# Patient Record
Sex: FEMALE | Race: WHITE
[De-identification: names, ages, dates, MRNs, and addresses within clinical notes are randomized per-mention and may not be internally consistent; named-entity substitution may affect disease eponyms.]

---

## 2017-11-30 NOTE — TN
cc:

SUZE DOMÍNGUEZ MD

****

 

 

DATE OF SURGERY:

11/30/2017

 

PREOPERATIVE DIAGNOSIS

Right wrist carpal tunnel syndrome.

 

POSTOPERATIVE DIAGNOSIS

Right wrist carpal tunnel syndrome.

 

 

PROCEDURE

Right wrist carpal tunnel release.

 

PROCEDURE IN DETAIL

Informed consent was obtained.  The patient taken to the operating room and

placed in supine position on the operative table.  She was administered general

anesthesia by Dr. Wood of the Anesthesia Department.  She was given a gram

of Ancef.  Prior to the initiation of the operative procedure, tourniquet was

applied to the right arm.  The right upper extremity was prepped with Betadine

soap followed by Betadine paint from the tourniquet to the tip of the fingers.

Draping commenced with, sterile down sheet, sterile towel about tourniquets,

split sheet stockinette was applied over the hand and forearm and extremity

drape was applied.  The hand was elevated and time-out was held and confirmed.

The patient had been given Ancef prior to initiate procedure and at that time

the tourniquet was inflated to 250 mmHg.  Stockinette was cut. Utilizing a

marking pen, a incision was mapped out in the volar aspect of the patient's

palm in-line with the fourth ray.  The incision was then made.  Bleeders were

coagulated using a Bovie.  Dissection was carried down through the subcutaneous

fatty tissue and over the ulnar nerve and artery which were identified and

protected throughout the remainder of the procedure.  Dissection was from below

the wrist crease to the superficial palmar arch and the release was felt to be

adequate.  At that time, dissection was carried out across the top of the palm

and a small cut was made into the fascia over the carpal tunnel. The Tolleson

elevator was passed into the carpal tunnel using light blunt dissection with

the Tolleson up against the hook of the hamate, the carpal tunnel was released.

This included release of the volar carpal and transverse carpal ligament.

Proximal to the wrist crease, this was done subcutaneously.  Once adequate

release had been performed, a small portion of the tunnel was excised.  The

tourniquet was then deflated and coagulation had been controlled.

 

The wound was then closed with 4-0 nylon interrupted horizontal mattress

stitches followed by Xeroform, 4x4s, Sof-Rol and a volar fiberglass slab

followed by Sof-Rol and Ace wrap.

 

The total tourniquet time was 20 minutes.

 

The patient tolerated procedure well and was then taken to the recovery room in

stable condition.  At the completion of the procedure, sponge count, instrument

count, needle counts were correct.  The estimated blood loss was less than 10

cc.

 

 

                              _________________________________

                              MD PHYLICIA Marshall/BOOM

D:  11/30/2017/3:33 PM

T:  12/1/2017/8:28 AM

Visit #:  [id number]

Job #:  [id number]

## 2018-04-26 ENCOUNTER — HOSPITAL ENCOUNTER (OUTPATIENT)
Dept: HOSPITAL 17 - HSDC | Age: 83
Discharge: HOME | End: 2018-04-26
Attending: PLASTIC SURGERY
Payer: MEDICARE

## 2018-04-26 VITALS — BODY MASS INDEX: 26.95 KG/M2 | WEIGHT: 157.85 LBS | HEIGHT: 64 IN

## 2018-04-26 VITALS
DIASTOLIC BLOOD PRESSURE: 60 MMHG | TEMPERATURE: 97.3 F | HEART RATE: 62 BPM | SYSTOLIC BLOOD PRESSURE: 142 MMHG | OXYGEN SATURATION: 97 % | RESPIRATION RATE: 16 BRPM

## 2018-04-26 DIAGNOSIS — Z79.4: ICD-10-CM

## 2018-04-26 DIAGNOSIS — Z79.82: ICD-10-CM

## 2018-04-26 DIAGNOSIS — E11.9: ICD-10-CM

## 2018-04-26 DIAGNOSIS — I10: ICD-10-CM

## 2018-04-26 DIAGNOSIS — G56.21: Primary | ICD-10-CM

## 2018-04-26 PROCEDURE — 64718 REVISE ULNAR NERVE AT ELBOW: CPT

## 2018-04-26 PROCEDURE — 93005 ELECTROCARDIOGRAM TRACING: CPT

## 2018-04-26 PROCEDURE — 01710 ANES PX NRV MUSC UPR A&E NOS: CPT

## 2018-04-26 NOTE — EKG
Date Performed: 04/26/2018       Time Performed: 07:43:04

 

PTAGE:      82 years

 

EKG:      Sinus rhythm 

 

 WITH SINUS ARRHYTHMIA NORMAL ECG 

 

NO PREVIOUS TRACING            

 

DOCTOR:   Jesse Lujan  Interpretating Date/Time  04/26/2018 16:15:23

## 2018-04-29 NOTE — PD.OP
__________________________________________________





Operative Report


Date of Surgery:  Apr 26, 2018


Preoperative Diagnosis:  


(1) Cubital tunnel syndrome on right


Postoperative Diagnosis:  


(1) Cubital tunnel syndrome on right


Procedure:


Right open cubital tunnel release (79652)


Anesthesia:


General


Surgeon:


Josse Hollingsworth


Assistant(s):


.


Operation and Findings:


This is an 82-year-old female who presented to clinic with signs and symptoms 

consistent with right cubital tunnel syndrome.  Risks benefits and alternative 

treatments were discussed.  All questions were answered and the patient 

expressed understanding.  The patient elected to assume the risks of right open 

cubital tunnel release.  Informed consent was obtained.  The surgical site was 

marked in the preoperative holding bay.  Antibodies were given on-call to the 

operating room.  The patient was taken to the operating room and all pressure 

points were padded.  A surgical timeout was performed.  After the smooth 

induction of general anesthesia an appropriately padded upper extremity 

tourniquet was placed.  The surgical site was prepped and draped in the usual 

sterile fashion.  Quarter percent Marcaine was instilled into the surgical 

site.  After exsanguination using an Esmarch bandage, the tourniquet was 

inflated to 250 mmHg.  An incision was made centered over the cubital tunnel 

and extending superiorly and inferiorly over the course of the ulnar nerve for 

roughly 6 cm in each direction.  Dissection was first carried bluntly down to 

Rosales's ligament taking care to avoid injury to any cutaneous nerve branches.

  The fascia just inferiorly to Rosales's ligament was incised over the ulnar 

nerve.  Using tenotomy scissors, the nerve was bluntly  from the 

overlying fascia, which was then incised.  Inferiorly this was carried well 

into the FCU muscle belly, taking care to avoid injury to the ulnar nerve 

branches to the FCU.  Following this the ulnar nerve was again bluntly freed 

from the overlying Rosales's ligament, which was cut with the 15 blade using 

the tenotomies as a backstop.  Dissection was then carried superiorly following 

the course of the ulnar nerve using the tenotomy's for at least 8 cm.  A 

roughly 2 cm segment of intermuscular septum was dissected free from adjacent 

structures and excised to avoid impingement upon the ulnar nerve.  The right 

elbow was then ranged passively and monitored for any ulnar nerve subluxation 

over the medial epicondyle, of which there was none.  The skin was then closed 

with 3-0 interrupted deep dermals followed by running 4-0 subcuticular, both 

using Monocryl.  The surgical site was cleaned.  The incision was dressed with 

mupirocin ointment, Xeroform gauze, 4 x 4 gauze, soft roll, and an Ace wrap 

extending from the hand to the proximal upper arm.  The tourniquet was let 

down.  All digits pinked up nicely.  The patient was awoken from anesthesia and 

arrived stable and doing well to the PACU all needle sponge and instrument 

counts were correct 2











Josse Hollingsworth MD Apr 29, 2018 19:30